# Patient Record
Sex: FEMALE | Race: BLACK OR AFRICAN AMERICAN | Employment: UNEMPLOYED | URBAN - METROPOLITAN AREA
[De-identification: names, ages, dates, MRNs, and addresses within clinical notes are randomized per-mention and may not be internally consistent; named-entity substitution may affect disease eponyms.]

---

## 2017-12-13 ENCOUNTER — ANESTHESIA (OUTPATIENT)
Dept: SURGERY | Age: 27
End: 2017-12-13
Payer: MEDICAID

## 2017-12-13 ENCOUNTER — ANESTHESIA EVENT (OUTPATIENT)
Dept: SURGERY | Age: 27
End: 2017-12-13
Payer: MEDICAID

## 2017-12-13 ENCOUNTER — HOSPITAL ENCOUNTER (OUTPATIENT)
Age: 27
Setting detail: OUTPATIENT SURGERY
Discharge: HOME | End: 2017-12-13
Attending: OBSTETRICS & GYNECOLOGY | Admitting: OBSTETRICS & GYNECOLOGY
Payer: MEDICAID

## 2017-12-13 VITALS
RESPIRATION RATE: 20 BRPM | OXYGEN SATURATION: 99 % | SYSTOLIC BLOOD PRESSURE: 110 MMHG | TEMPERATURE: 97.5 F | HEART RATE: 80 BPM | HEIGHT: 61 IN | DIASTOLIC BLOOD PRESSURE: 64 MMHG | BODY MASS INDEX: 27.09 KG/M2 | WEIGHT: 143.5 LBS

## 2017-12-13 PROBLEM — N92.6 IRREGULAR MENSTRUATION: Chronic | Status: ACTIVE | Noted: 2017-12-13

## 2017-12-13 PROBLEM — N83.201 CYST OF RIGHT OVARY: Chronic | Status: ACTIVE | Noted: 2017-12-13

## 2017-12-13 PROBLEM — R10.31 RIGHT LOWER QUADRANT ABDOMINAL PAIN: Chronic | Status: ACTIVE | Noted: 2017-12-13

## 2017-12-13 PROBLEM — N83.201 CYST OF RIGHT OVARY: Chronic | Status: RESOLVED | Noted: 2017-12-13 | Resolved: 2017-12-13

## 2017-12-13 LAB — HCG SERPL QL: NEGATIVE

## 2017-12-13 PROCEDURE — 77030018836 HC SOL IRR NACL ICUM -A: Performed by: OBSTETRICS & GYNECOLOGY

## 2017-12-13 PROCEDURE — 77030020782 HC GWN BAIR PAWS FLX 3M -B: Performed by: OBSTETRICS & GYNECOLOGY

## 2017-12-13 PROCEDURE — 84703 CHORIONIC GONADOTROPIN ASSAY: CPT | Performed by: OBSTETRICS & GYNECOLOGY

## 2017-12-13 PROCEDURE — 76210000017 HC OR PH I REC 1.5 TO 2 HR: Performed by: OBSTETRICS & GYNECOLOGY

## 2017-12-13 PROCEDURE — 77030006643: Performed by: ANESTHESIOLOGY

## 2017-12-13 PROCEDURE — 77030020255 HC SOL INJ LR 1000ML BG: Performed by: OBSTETRICS & GYNECOLOGY

## 2017-12-13 PROCEDURE — 76210000021 HC REC RM PH II 0.5 TO 1 HR: Performed by: OBSTETRICS & GYNECOLOGY

## 2017-12-13 PROCEDURE — 74011250636 HC RX REV CODE- 250/636: Performed by: OBSTETRICS & GYNECOLOGY

## 2017-12-13 PROCEDURE — 76060000033 HC ANESTHESIA 1 TO 1.5 HR: Performed by: OBSTETRICS & GYNECOLOGY

## 2017-12-13 PROCEDURE — 74011250636 HC RX REV CODE- 250/636

## 2017-12-13 PROCEDURE — 77030008517 HC TBNG INSUF ENDO STOR -B: Performed by: OBSTETRICS & GYNECOLOGY

## 2017-12-13 PROCEDURE — 36415 COLL VENOUS BLD VENIPUNCTURE: CPT | Performed by: OBSTETRICS & GYNECOLOGY

## 2017-12-13 PROCEDURE — 74011000250 HC RX REV CODE- 250: Performed by: OBSTETRICS & GYNECOLOGY

## 2017-12-13 PROCEDURE — 77030008683 HC TU ET CUF COVD -A: Performed by: ANESTHESIOLOGY

## 2017-12-13 PROCEDURE — 88305 TISSUE EXAM BY PATHOLOGIST: CPT | Performed by: OBSTETRICS & GYNECOLOGY

## 2017-12-13 PROCEDURE — 77030005537 HC CATH URETH BARD -A: Performed by: OBSTETRICS & GYNECOLOGY

## 2017-12-13 PROCEDURE — 77030008602 HC TRCR ENDOSC EPATH J&J -B: Performed by: OBSTETRICS & GYNECOLOGY

## 2017-12-13 PROCEDURE — 77030019927 HC TBNG IRR CYSTO BAXT -A: Performed by: OBSTETRICS & GYNECOLOGY

## 2017-12-13 PROCEDURE — 77030008603 HC TRCR ENDOSC EPATH J&J -C: Performed by: OBSTETRICS & GYNECOLOGY

## 2017-12-13 PROCEDURE — 74011250636 HC RX REV CODE- 250/636: Performed by: ANESTHESIOLOGY

## 2017-12-13 PROCEDURE — 76010000149 HC OR TIME 1 TO 1.5 HR: Performed by: OBSTETRICS & GYNECOLOGY

## 2017-12-13 PROCEDURE — 77030008477 HC STYL SATN SLP COVD -A: Performed by: ANESTHESIOLOGY

## 2017-12-13 PROCEDURE — 77030002933 HC SUT MCRYL J&J -A: Performed by: OBSTETRICS & GYNECOLOGY

## 2017-12-13 PROCEDURE — 74011000250 HC RX REV CODE- 250

## 2017-12-13 RX ORDER — BUPIVACAINE HYDROCHLORIDE 2.5 MG/ML
INJECTION, SOLUTION EPIDURAL; INFILTRATION; INTRACAUDAL AS NEEDED
Status: DISCONTINUED | OUTPATIENT
Start: 2017-12-13 | End: 2017-12-13 | Stop reason: HOSPADM

## 2017-12-13 RX ORDER — FENTANYL CITRATE 50 UG/ML
50 INJECTION, SOLUTION INTRAMUSCULAR; INTRAVENOUS
Status: DISCONTINUED | OUTPATIENT
Start: 2017-12-13 | End: 2017-12-13 | Stop reason: HOSPADM

## 2017-12-13 RX ORDER — SODIUM CHLORIDE, SODIUM LACTATE, POTASSIUM CHLORIDE, CALCIUM CHLORIDE 600; 310; 30; 20 MG/100ML; MG/100ML; MG/100ML; MG/100ML
100 INJECTION, SOLUTION INTRAVENOUS CONTINUOUS
Status: DISCONTINUED | OUTPATIENT
Start: 2017-12-13 | End: 2017-12-13 | Stop reason: HOSPADM

## 2017-12-13 RX ORDER — LIDOCAINE HYDROCHLORIDE 20 MG/ML
INJECTION, SOLUTION EPIDURAL; INFILTRATION; INTRACAUDAL; PERINEURAL AS NEEDED
Status: DISCONTINUED | OUTPATIENT
Start: 2017-12-13 | End: 2017-12-13 | Stop reason: HOSPADM

## 2017-12-13 RX ORDER — FLUMAZENIL 0.1 MG/ML
0.2 INJECTION INTRAVENOUS
Status: DISCONTINUED | OUTPATIENT
Start: 2017-12-13 | End: 2017-12-13 | Stop reason: HOSPADM

## 2017-12-13 RX ORDER — PROPOFOL 10 MG/ML
INJECTION, EMULSION INTRAVENOUS AS NEEDED
Status: DISCONTINUED | OUTPATIENT
Start: 2017-12-13 | End: 2017-12-13 | Stop reason: HOSPADM

## 2017-12-13 RX ORDER — FENTANYL CITRATE 50 UG/ML
INJECTION, SOLUTION INTRAMUSCULAR; INTRAVENOUS AS NEEDED
Status: DISCONTINUED | OUTPATIENT
Start: 2017-12-13 | End: 2017-12-13 | Stop reason: HOSPADM

## 2017-12-13 RX ORDER — SODIUM CHLORIDE, SODIUM LACTATE, POTASSIUM CHLORIDE, CALCIUM CHLORIDE 600; 310; 30; 20 MG/100ML; MG/100ML; MG/100ML; MG/100ML
125 INJECTION, SOLUTION INTRAVENOUS CONTINUOUS
Status: DISCONTINUED | OUTPATIENT
Start: 2017-12-13 | End: 2017-12-13 | Stop reason: HOSPADM

## 2017-12-13 RX ORDER — DEXAMETHASONE SODIUM PHOSPHATE 4 MG/ML
INJECTION, SOLUTION INTRA-ARTICULAR; INTRALESIONAL; INTRAMUSCULAR; INTRAVENOUS; SOFT TISSUE AS NEEDED
Status: DISCONTINUED | OUTPATIENT
Start: 2017-12-13 | End: 2017-12-13 | Stop reason: HOSPADM

## 2017-12-13 RX ORDER — NALOXONE HYDROCHLORIDE 0.4 MG/ML
0.4 INJECTION, SOLUTION INTRAMUSCULAR; INTRAVENOUS; SUBCUTANEOUS AS NEEDED
Status: DISCONTINUED | OUTPATIENT
Start: 2017-12-13 | End: 2017-12-13 | Stop reason: HOSPADM

## 2017-12-13 RX ORDER — NEOSTIGMINE METHYLSULFATE 5 MG/5 ML
SYRINGE (ML) INTRAVENOUS AS NEEDED
Status: DISCONTINUED | OUTPATIENT
Start: 2017-12-13 | End: 2017-12-13 | Stop reason: HOSPADM

## 2017-12-13 RX ORDER — OXYCODONE AND ACETAMINOPHEN 5; 325 MG/1; MG/1
1 TABLET ORAL AS NEEDED
Status: DISCONTINUED | OUTPATIENT
Start: 2017-12-13 | End: 2017-12-13 | Stop reason: HOSPADM

## 2017-12-13 RX ORDER — ONDANSETRON 2 MG/ML
4 INJECTION INTRAMUSCULAR; INTRAVENOUS ONCE
Status: DISCONTINUED | OUTPATIENT
Start: 2017-12-13 | End: 2017-12-13 | Stop reason: HOSPADM

## 2017-12-13 RX ORDER — SODIUM CHLORIDE 0.9 % (FLUSH) 0.9 %
5-10 SYRINGE (ML) INJECTION AS NEEDED
Status: DISCONTINUED | OUTPATIENT
Start: 2017-12-13 | End: 2017-12-13 | Stop reason: HOSPADM

## 2017-12-13 RX ORDER — MIDAZOLAM HYDROCHLORIDE 1 MG/ML
INJECTION, SOLUTION INTRAMUSCULAR; INTRAVENOUS AS NEEDED
Status: DISCONTINUED | OUTPATIENT
Start: 2017-12-13 | End: 2017-12-13 | Stop reason: HOSPADM

## 2017-12-13 RX ORDER — ONDANSETRON 2 MG/ML
INJECTION INTRAMUSCULAR; INTRAVENOUS AS NEEDED
Status: DISCONTINUED | OUTPATIENT
Start: 2017-12-13 | End: 2017-12-13 | Stop reason: HOSPADM

## 2017-12-13 RX ORDER — GLYCOPYRROLATE 0.2 MG/ML
INJECTION INTRAMUSCULAR; INTRAVENOUS AS NEEDED
Status: DISCONTINUED | OUTPATIENT
Start: 2017-12-13 | End: 2017-12-13 | Stop reason: HOSPADM

## 2017-12-13 RX ORDER — ROCURONIUM BROMIDE 10 MG/ML
INJECTION, SOLUTION INTRAVENOUS AS NEEDED
Status: DISCONTINUED | OUTPATIENT
Start: 2017-12-13 | End: 2017-12-13 | Stop reason: HOSPADM

## 2017-12-13 RX ADMIN — SODIUM CHLORIDE, SODIUM LACTATE, POTASSIUM CHLORIDE, AND CALCIUM CHLORIDE: 600; 310; 30; 20 INJECTION, SOLUTION INTRAVENOUS at 15:39

## 2017-12-13 RX ADMIN — FENTANYL CITRATE 100 MCG: 50 INJECTION, SOLUTION INTRAMUSCULAR; INTRAVENOUS at 15:23

## 2017-12-13 RX ADMIN — DEXAMETHASONE SODIUM PHOSPHATE 4 MG: 4 INJECTION, SOLUTION INTRA-ARTICULAR; INTRALESIONAL; INTRAMUSCULAR; INTRAVENOUS; SOFT TISSUE at 15:21

## 2017-12-13 RX ADMIN — FENTANYL CITRATE 50 MCG: 50 INJECTION, SOLUTION INTRAMUSCULAR; INTRAVENOUS at 15:56

## 2017-12-13 RX ADMIN — Medication 3 MG: at 16:17

## 2017-12-13 RX ADMIN — FENTANYL CITRATE 50 MCG: 50 INJECTION, SOLUTION INTRAMUSCULAR; INTRAVENOUS at 17:03

## 2017-12-13 RX ADMIN — LIDOCAINE HYDROCHLORIDE 100 MG: 20 INJECTION, SOLUTION EPIDURAL; INFILTRATION; INTRACAUDAL; PERINEURAL at 15:26

## 2017-12-13 RX ADMIN — ROCURONIUM BROMIDE 35 MG: 10 INJECTION, SOLUTION INTRAVENOUS at 15:26

## 2017-12-13 RX ADMIN — SODIUM CHLORIDE, SODIUM LACTATE, POTASSIUM CHLORIDE, AND CALCIUM CHLORIDE 125 ML/HR: 600; 310; 30; 20 INJECTION, SOLUTION INTRAVENOUS at 10:07

## 2017-12-13 RX ADMIN — MIDAZOLAM HYDROCHLORIDE 2 MG: 1 INJECTION, SOLUTION INTRAMUSCULAR; INTRAVENOUS at 15:19

## 2017-12-13 RX ADMIN — FENTANYL CITRATE 50 MCG: 50 INJECTION, SOLUTION INTRAMUSCULAR; INTRAVENOUS at 17:22

## 2017-12-13 RX ADMIN — PROPOFOL 180 MG: 10 INJECTION, EMULSION INTRAVENOUS at 15:26

## 2017-12-13 RX ADMIN — GLYCOPYRROLATE 0.2 MG: 0.2 INJECTION INTRAMUSCULAR; INTRAVENOUS at 16:17

## 2017-12-13 RX ADMIN — FENTANYL CITRATE 50 MCG: 50 INJECTION, SOLUTION INTRAMUSCULAR; INTRAVENOUS at 16:11

## 2017-12-13 RX ADMIN — ONDANSETRON 4 MG: 2 INJECTION INTRAMUSCULAR; INTRAVENOUS at 16:14

## 2017-12-13 NOTE — IP AVS SNAPSHOT
[facility] 
 
 
 [address] [facility] [address] 
[phone] Patient: Rodrigo Kat MRN: [id number] TKT:[id number] About your hospitalization You were admitted on:  December 13, 2017 You last received care in the:  [facility] PACU You were discharged on:  December 13, 2017 Why you were hospitalized Your primary diagnosis was:  Cyst Of Right Ovary Your diagnoses also included:  Right Lower Quadrant Abdominal Pain, Irregular Menstruation Things You Need To Do (next 8 weeks) Follow up with None Where:  None (395) Patient stated that they have no PCP Schedule an appointment with Gabrielle Anderson MD as soon as possible for a visit in 1 week(s) Phone:  [phone] Where:  1 [facility], [address] [place] [address] Discharge Orders None A check sia indicates which time of day the medication should be taken. My Medications ASK your physician about these medications Instructions Each Dose to Equal  
 Morning Noon Evening Bedtime  
 pnv w/o calcium-iron fum-fa 27-1 mg Tab Your last dose was: Your next dose is: Take  by mouth daily. Discharge Instructions [facility], [address] [facility], [address] [facility], [address] [place] [address],[facility] Office: [phone] Fax:    [phone] PROCEDURE: Procedure(s): DILATION AND CURETTAGE, HYSTEROSCOPY, LAPAROSCOPIC RIGHT OVARIAN CYSTECTOMY Notify [facility] OB/GYN IMMEDIATELY if any of the following occur: ? You are unable to urinate. Urgency to urinate is not uncommon. ? Excessive vaginal bleeding > 1 maxi pad an hour for more then 2 hours straight. ? Temperature above 101.0° and / or chills. ? You are nauseous and / or vomiting and you cannot hold down any fluids. ? Your pain is not controlled with the pain medication prescribed. Special Considerations:  
 
? Do not drive for at least 24 hours after the procedure and until you are no longer taking narcotic pain medication and you are able to move and react without hesitation. ? You may return to work in 1 weeks. Pelvic rest (nothing in the vagina) for 3 weeks. No heavy lifting over 10 pounds & no strenuous exercise for 3 weeks. Other instructions: MEDICATIONS: PROVIDED AT DISCHARGE OR PREVIOUSLY PRESCRIBED AT PRE OPERATIVE APPOINTMENT WITH [facility] OB[facility] -- 
Narcotic Pain Med. Vicodin®     Percocet®     Dilaudid® Non-narcotic Pain Med. Ibuprofen Antibiotics     Cipro®     Keflex®       Bactrim DS® Urgency      Vesicare® Nausea [place] Phenergan® Other       Colace If you have not already scheduled your post operative appointment please do so with our office staff. Your appointment should be in 3 weeks. Please contact [facility]. OB/GYN at [phone] or go to the nearest Emergency Department / Urgent Care facility for any other medical questions or concerns. DISCHARGE SUMMARY from Nurse PATIENT INSTRUCTIONS: 
 
After general anesthesia or intravenous sedation, for 24 hours or while taking prescription Narcotics: · Limit your activities · Do not drive and operate hazardous machinery · Do not make important personal or business decisions · Do  not drink alcoholic beverages · If you have not urinated within 8 hours after discharge, please contact your surgeon on call. Report the following to your surgeon: 
· Excessive pain, swelling, redness or odor of or around the surgical area · Temperature over 100.5 · Nausea and vomiting lasting longer than 4 hours or if unable to take medications · Any signs of decreased circulation or nerve impairment to extremity: change in color, persistent  numbness, tingling, coldness or increase pain · Any questions What to do at Home: 
Recommended activity: Activity as tolerated and no driving for today, If you experience any of the following symptoms fever, chills, uncontrollable pain or nausea, please follow up with . 
 
*  Please give a list of your current medications to your Primary Care Provider. *  Please update this list whenever your medications are discontinued, doses are 
    changed, or new medications (including over-the-counter products) are added. *  Please carry medication information at all times in case of emergency situations. These are general instructions for a healthy lifestyle: No smoking/ No tobacco products/ Avoid exposure to second hand smoke Surgeon General's Warning:  Quitting smoking now greatly reduces serious risk to your health. Obesity, smoking, and sedentary lifestyle greatly increases your risk for illness A healthy diet, regular physical exercise & weight monitoring are important for maintaining a healthy lifestyle You may be retaining fluid if you have a history of heart failure or if you experience any of the following symptoms:  Weight gain of 3 pounds or more overnight or 5 pounds in a week, increased swelling in our hands or feet or shortness of breath while lying flat in bed. Please call your doctor as soon as you notice any of these symptoms; do not wait until your next office visit. Recognize signs and symptoms of STROKE: 
 
F-face looks uneven A-arms unable to move or move unevenly S-speech slurred or non-existent T-time-call 911 as soon as signs and symptoms begin-DO NOT go Back to bed or wait to see if you get better-TIME IS BRAIN. Warning Signs of HEART ATTACK Call 911 if you have these symptoms: 
? Chest discomfort.  Most heart attacks involve discomfort in the center of the chest that lasts more than a few minutes, or that goes away and comes back. It can feel like uncomfortable pressure, squeezing, fullness, or pain. ? Discomfort in other areas of the upper body. Symptoms can include pain or discomfort in one or both arms, the back, neck, jaw, or stomach. ? Shortness of breath with or without chest discomfort. ? Other signs may include breaking out in a cold sweat, nausea, or lightheadedness. Don't wait more than five minutes to call [address]! Fast action can save your life. Calling 911 is almost always the fastest way to get lifesaving treatment. Emergency Medical Services staff can begin treatment when they arrive  up to an hour sooner than if someone gets to the hospital by car. The discharge information has been reviewed with the patient and caregiver. The patient and caregiver verbalized understanding. Discharge medications reviewed with the patient and caregiver and appropriate educational materials and side effects teaching were provided. ___________________________________________________________________________________________________________________________________ Patient armband removed and shredded [employer] Announcement We are excited to announce that we are making your provider's discharge notes available to you in [employer]. You will see these notes when they are completed and signed by the physician that discharged you from your recent hospital stay. If you have any questions or concerns about any information you see in FClubhart, please call the Health Information Department where you were seen or reach out to your Primary Care Provider for more information about your plan of care. Introducing [facility]! Trisha Mcduffie introduces [employer] patient portal. Now you can access parts of your medical record, email your doctor's office, and request medication refills online.    
 
1. In your internet browser, go to [email or web address] 2. Click on the First Time User? Click Here link in the Sign In box. You will see the New Member Sign Up page. 3. Enter your [employer] Systems Access Code exactly as it appears below. You will not need to use this code after youve completed the sign-up process. If you do not sign up before the expiration date, you must request a new code. · [employer] Access Code: [email or web address] Expires: 3/13/2018  9:07 AM 
 
4. Enter the last four digits of your Social Security Number (xxxx) and Date of Birth (mm/dd/yyyy) as indicated and click Submit. You will be taken to the next sign-up page. 5. Create a JoyTunest ID. This will be your [employer] login ID and cannot be changed, so think of one that is secure and easy to remember. 6. Create a [employer] password. You can change your password at any time. 7. Enter your Password Reset Question and Answer. This can be used at a later time if you forget your password. 8. Enter your e-mail address. You will receive e-mail notification when new information is available in [address] 9. Click Sign Up. You can now view and download portions of your medical record. 10. Click the Download Summary menu link to download a portable copy of your medical information. If you have questions, please visit the Frequently Asked Questions section of the [employer] website. Remember, [employer] is NOT to be used for urgent needs. For medical emergencies, dial 911. Now available from your iPhone and Android! Providers Seen During Your Hospitalization Provider Specialty Primary office phone Gabrielle Anderson MD Obstetrics & Gynecology [phone] Your Primary Care Physician (PCP) Primary Care Physician Office Phone Office Fax NONE ** None ** ** None ** You are allergic to the following Allergen Reactions Latex Rash Penicillins Rash Tramadol Nausea Only Recent Documentation Height Weight BMI OB Status Smoking Status 1.549 m 65.1 kg 27.11 kg/m2 Having regular periods Never Smoker Emergency Contacts Name Discharge Info Relation Home Work Mobile Naldo Samano DISCHARGE CAREGIVER [3] Sister [23]   [phone] Genaro Rollins DECLINED CAREGIVER [4] Friend [5] [phone] Patient Belongings The following personal items are in your possession at time of discharge: 
  Dental Appliances: None  Visual Aid: None      Home Medications: None   Jewelry: Earrings, Sent home (to sandi)  Clothing: Pants, Shirt, Undergarments, Footwear, Socks, Jacket/Coat (locker 17)    Other Valuables: Purse, [employer], Sent home (to sandi) Please provide this summary of care documentation to your next provider. Signatures-by signing, you are acknowledging that this After Visit Summary has been reviewed with you and you have received a copy. Patient Signature:  ____________________________________________________________ Date:  ____________________________________________________________  
  
Silvana Fernandoer Provider Signature:  ____________________________________________________________ Date:  ____________________________________________________________

## 2017-12-13 NOTE — BRIEF OP NOTE
BRIEF OPERATIVE NOTE    Date of Procedure: 12/13/2017   Preoperative Diagnosis: OVARIAN CYST RIGHT SIDE, IRREGULAR MENSTRUATION  Postoperative Diagnosis: OVARIAN CYST RIGHT SIDE, IRREGULAR MENSTRUATION    Procedure(s):  DILATION AND CURETTAGE, HYSTEROSCOPY, LAPAROSCOPIC RIGHT OVARIAN CYSTECTOMY  Surgeon(s) and Role:     * Silvano Vidal MD - Primary         Assistant Staff:       Surgical Staff:  Circ-1: Sweta Castañeda RN  Circ-Relief: Yumiko Murray  Scrub Tech-1: Sahil Mejia  Scrub RN-1: Abner Hall RN  Float Staff: Maru Amin RN  Event Time In   Incision Start 1550   Incision Close 1620     Anesthesia: General   Estimated Blood Loss: min  Specimens:   ID Type Source Tests Collected by Time Destination   1 : Endometrial Curettings Preservative Endometrial Curetting  Silvano Vidal MD 12/13/2017 1603 Pathology   2 : Right Ovarian Cyst Preservative Ovarian Cyst,Right  Silvano Vidal MD 12/13/2017 1615 Pathology      Findings: see full op report   Complications: none  Implants: * No implants in log *

## 2017-12-13 NOTE — ANESTHESIA POSTPROCEDURE EVALUATION
Post-Anesthesia Evaluation and Assessment    Patient: Gracie Bliss MRN: [id number]  SSN: [id number]   YOB: 1990  Age: 32 y.o. Sex: female      Cardiovascular Function/Vital Signs  /72  Pulse 83  Temp 36.3 °C (97.3 °F)  Resp 21  Ht 5' 1\" (1.549 m)  Wt 65.1 kg (143 lb 8 oz)  LMP 12/10/2017  SpO2 100%  BMI 27.11 kg/m2    Patient is status post Procedure(s):  DILATION AND CURETTAGE, HYSTEROSCOPY, LAPAROSCOPIC RIGHT OVARIAN CYSTECTOMY. Nausea/Vomiting: Controlled. Postoperative hydration reviewed and adequate. Pain:  Pain Scale 1: Numeric (0 - 10) (12/13/17 1745)  Pain Intensity 1: 3 (12/13/17 1745)   Managed. Neurological Status:   Neuro (WDL): Within Defined Limits (12/13/17 1745)   At baseline. Mental Status and Level of Consciousness: Awake/alert    Pulmonary Status:   O2 Device: Non-rebreather mask (12/13/17 1631)   Adequate oxygenation and airway patent. Complications related to anesthesia: None    Post-anesthesia assessment completed. No concerns. Patient has met all discharge requirements.     Signed By: Nidhi Morris CRNA    December 13, 2017

## 2017-12-13 NOTE — ANESTHESIA PREPROCEDURE EVALUATION
Anesthetic History   No history of anesthetic complications            Review of Systems / Medical History  Patient summary reviewed, nursing notes reviewed and pertinent labs reviewed    Pulmonary            Asthma : well controlled  Pertinent negatives: No COPD, recent URI, sleep apnea and smoker     Neuro/Psych   Within defined limits           Cardiovascular  Within defined limits                Exercise tolerance: >4 METS     GI/Hepatic/Renal         Renal disease: stones    Pertinent negatives: No GERD, hepatitis and liver disease   Endo/Other        Anemia  Pertinent negatives: No diabetes, hypothyroidism, hyperthyroidism, obesity and blood dyscrasia   Other Findings              Physical Exam    Airway  Mallampati: II  TM Distance: 4 - 6 cm  Neck ROM: normal range of motion   Mouth opening: Normal     Cardiovascular  Regular rate and rhythm,  S1 and S2 normal,  no murmur, click, rub, or gallop             Dental  No notable dental hx       Pulmonary  Breath sounds clear to auscultation               Abdominal  GI exam deferred       Other Findings            Anesthetic Plan    ASA: 2  Anesthesia type: general          Induction: Intravenous  Anesthetic plan and risks discussed with: Patient and Family      GA/OETT

## 2017-12-13 NOTE — DISCHARGE INSTRUCTIONS
[facility], [address], [place]  [facility], [address] [facility], [address] [facility], [place], [address] [facility]  Office: [phone]  Fax:    [phone]    PROCEDURE: Procedure(s):  DILATION AND CURETTAGE, HYSTEROSCOPY, LAPAROSCOPIC RIGHT OVARIAN CYSTECTOMY    Notify [facility] OB/GYN IMMEDIATELY if any of the following occur:     You are unable to urinate. Urgency to urinate is not uncommon.  Excessive vaginal bleeding > 1 maxi pad an hour for more then 2 hours straight.  Temperature above 101.0° and / or chills.  You are nauseous and / or vomiting and you cannot hold down any fluids.  Your pain is not controlled with the pain medication prescribed. Special Considerations:      Do not drive for at least 24 hours after the procedure and until you are no longer taking narcotic pain medication and you are able to move and react without hesitation.  You may return to work in 1 weeks. [x] Pelvic rest (nothing in the vagina) for 3 weeks. [x] No heavy lifting over 10 pounds & no strenuous exercise for 3 weeks. [] Other instructions:         MEDICATIONS: PROVIDED AT DISCHARGE OR PREVIOUSLY PRESCRIBED AT PRE OPERATIVE APPOINTMENT WITH [facility] OBSTETRICS --  Narcotic Pain Med.   []  Vicodin®   [x]  Percocet®   []  Dilaudid®    Non-narcotic Pain Med. [x]   Ibuprofen        Antibiotics   []  Cipro®   []  Keflex®     []  Bactrim DS®       Urgency   []   Vesicare®       Nausea      []    Zofran®     []    Phenergan®       Other   []    Colace          If you have not already scheduled your post operative appointment please do so with our office staff. Your appointment should be in 3 weeks. Please contact [place] [address] OB/GYN at [phone] or go to the nearest Emergency Department / Urgent Care facility for any other medical questions or concerns.              DISCHARGE SUMMARY from Nurse    PATIENT INSTRUCTIONS:    After general anesthesia or intravenous sedation, for 24 hours or while taking prescription Narcotics:  · Limit your activities  · Do not drive and operate hazardous machinery  · Do not make important personal or business decisions  · Do  not drink alcoholic beverages  · If you have not urinated within 8 hours after discharge, please contact your surgeon on call. Report the following to your surgeon:  · Excessive pain, swelling, redness or odor of or around the surgical area  · Temperature over 100.5  · Nausea and vomiting lasting longer than 4 hours or if unable to take medications  · Any signs of decreased circulation or nerve impairment to extremity: change in color, persistent  numbness, tingling, coldness or increase pain  · Any questions    What to do at Home:  Recommended activity: Activity as tolerated and no driving for today,     If you experience any of the following symptoms fever, chills, uncontrollable pain or nausea, please follow up with .    *  Please give a list of your current medications to your Primary Care Provider. *  Please update this list whenever your medications are discontinued, doses are      changed, or new medications (including over-the-counter products) are added. *  Please carry medication information at all times in case of emergency situations. These are general instructions for a healthy lifestyle:    No smoking/ No tobacco products/ Avoid exposure to second hand smoke  Surgeon General's Warning:  Quitting smoking now greatly reduces serious risk to your health.     Obesity, smoking, and sedentary lifestyle greatly increases your risk for illness    A healthy diet, regular physical exercise & weight monitoring are important for maintaining a healthy lifestyle    You may be retaining fluid if you have a history of heart failure or if you experience any of the following symptoms:  Weight gain of 3 pounds or more overnight or 5 pounds in a week, increased swelling in our hands or feet or shortness of breath while lying flat in bed. Please call your doctor as soon as you notice any of these symptoms; do not wait until your next office visit. Recognize signs and symptoms of STROKE:    F-face looks uneven    A-arms unable to move or move unevenly    S-speech slurred or non-existent    T-time-call 911 as soon as signs and symptoms begin-DO NOT go       Back to bed or wait to see if you get better-TIME IS BRAIN. Warning Signs of HEART ATTACK     Call 911 if you have these symptoms:   Chest discomfort. Most heart attacks involve discomfort in the center of the chest that lasts more than a few minutes, or that goes away and comes back. It can feel like uncomfortable pressure, squeezing, fullness, or pain.  Discomfort in other areas of the upper body. Symptoms can include pain or discomfort in one or both arms, the back, neck, jaw, or stomach.  Shortness of breath with or without chest discomfort.  Other signs may include breaking out in a cold sweat, nausea, or lightheadedness. Don't wait more than five minutes to call 911 - MINUTES MATTER! Fast action can save your life. Calling 911 is almost always the fastest way to get lifesaving treatment. Emergency Medical Services staff can begin treatment when they arrive -- up to an hour sooner than if someone gets to the hospital by car. The discharge information has been reviewed with the patient and caregiver. The patient and caregiver verbalized understanding. Discharge medications reviewed with the patient and caregiver and appropriate educational materials and side effects teaching were provided.   ___________________________________________________________________________________________________________________________________    Patient armband removed and shredded

## 2017-12-13 NOTE — DISCHARGE SUMMARY
Discharge Summary     Name: aMricel Simmons MRN: [id number]  SSN: [id number]    YOB: 1990  Age: 32 y.o. Sex: female      Admit Date: 12/13/2017    Discharge Date: 12/13/2017      Admitting Physician: Amparo Chapa MD     * Admission Diagnoses: Abnormal uterine bleeding, Ovarian cyst persistent, Right lower quadrant pain    * Discharge Diagnoses:   Hospital Problems as of 12/13/2017  Date Reviewed: 12/13/2017          Codes Class Noted - Resolved POA    Right lower quadrant abdominal pain (Chronic) ICD-10-CM: R10.31  ICD-9-CM: 789.03  12/13/2017 - Present Yes        * (Principal)Cyst of right ovary (Chronic) ICD-10-CM: N83.201  ICD-9-CM: 620.2  12/13/2017 - Present Yes        Irregular menstruation (Chronic) ICD-10-CM: N92.6  ICD-9-CM: 626.4  12/13/2017 - Present Yes               * Procedures: Laparoscopic Right Ovarian Cystectomy, Hysteroscopy D&C    * Discharge Condition: [facility] Course: Normal hospital course for this procedure. Significant Diagnostic Studies:   Recent Results (from the past 24 hour(s))   HCG QL SERUM    Collection Time: 12/13/17  9:20 AM   Result Value Ref Range    HCG, Ql. NEGATIVE  NEG         * Disposition: Home    Discharge Medications:   Current Discharge Medication List           * Follow-up Care/Patient Instructions: Activity: No sex, douching, or tampons for 3 weeks or as directed by your physician. No heavy lifting for 3 weeks. No driving while taking pain medication. Diet: Resume pre-hospital diet  Wound Care: Keep wound clean and dry and pelvic rest x 3 weeks.        Signed By:  Amparo Chapa MD     December 13, 2017

## 2017-12-14 NOTE — PERIOP NOTES
Discharge instructions reviewed with patient and caregiver. No questions. Patient stated she has prescriptions filled at home. Peripad given to patient. Patient dressed with caregiver's help. Instructions given to caregiver. Taken to private vehicle via wheelcair. Patient awake and alert. No distress noted.

## 2017-12-15 NOTE — OP NOTES
[facility] [place]  OPERATIVE REPORT    Benjamin Lomas  MR#: [id number]  : 1990  ACCOUNT #: [de-identified]   DATE OF SERVICE: 2017    PREOPERATIVE DIAGNOSES:    1. Right lower quadrant pain. 2.  Persistent right ovarian cyst.  3.  Irregular menses. POSTOPERATIVE DIAGNOSES:    1. Right lower quadrant pain. 2.  Persistent right ovarian cyst.  3.  Irregular menses. PROCEDURES PERFORMED:    1. Laparoscopic right ovarian cystectomy. 2.  Hysteroscopy, dilation and curettage. SURGEON:  Prudence Yung MD    ANESTHESIOLOGIST:  Dr. Ann Kendrick:  General and paracervical block. COMPLICATIONS:  None. ESTIMATED BLOOD LOSS:  Minimal.    INTRAVENOUS FLUIDS:  1700 mL lactated Ringer's. SPECIMENS:    1.  Endometrial curettings. 2.  Right ovarian cyst wall. FINDINGS:    1.  A anteverted uterus sounding to 7 cm with normal-appearing ostia bilaterally and overall unremarkable endometrial cavity. 2.  On laparoscopy, normal-appearing uterus and fallopian tubes and left ovary. Right ovary normal size with a small ovarian cyst with clear fluid. INDICATIONS:  This is a 32year old complaining of right lower quadrant pain with a known history of ovarian cyst and 2 previous ovarian cystectomies with a repeat transvaginal ultrasound demonstrating persistence of a right ovarian cyst with septum in place and original size of 2.7 cm, had increased since then in size. Risks, benefits and alternatives were discussed and she opted for surgical management as stated above. She also has a history of irregular menses and opted for hysteroscopy D and C for further endometrial evaluation and sampling at the same time. DESCRIPTION OF PROCEDURE:  The patient was taken to the OR where general anesthesia was obtained without difficulty. She was prepared and draped in the usual sterile fashion in the dorsal lithotomy position with legs in stirrups.   A weighted speculum was placed on the vagina so as to retract the anterior fornix to expose the cervix. The anterior lip of the cervix was grasped with a single-tooth tenaculum and a paracervical block was performed using 20 mL of 0.25% Marcaine plain. The cervical os was gradually dilated to allow introduction of the hysteroscope. This was advanced into the uterus under direct visualization with the water running. The above findings were noted. The hysteroscope was removed. A gentle curettage was performed until a gritty texture was noted throughout. Specimens handed off. Hulka tenaculum placed into the uterus as a means to manipulate it throughout the remainder of the case. All other instruments removed. Excellent hemostasis assured. Attention was then turned to above where a towel clip was placed in infraumbilical fold. A 5 mm vertical skin incision was made in the infraumbilical fold and a Veress needle was placed with 2 audible pops. The CO2 gas was turned on and allowed to insufflate the abdomen to about 15 mmHg. The Veress needle was removed and a 5 mm bladed trocar was advanced intraabdominally under direct visualization. The CO2 gas was turned back on. Immediate inspection beneath the trocar insertion site demonstrated no obvious trauma. The patient was placed in steep Trendelenburg. Two additional 5 mm bladed trocars were placed, one in the left lower quadrant 2 fingerbreadths medial and superior to the anterior and superior iliac spine and another midline 2 fingerbreadths above the pubic bone in a previous trocar scar. Attention was turned to the right adnexa where the fallopian tube was grasped with an atraumatic grasper to expose the physic portion of the right ovary. This was then cut along the ovarian cortex with mono cecilio with cautery. Linear fashion cystotomy performed with clear fluids noted and the cyst wall was removed with a biopsy grasper.   There bipolar Kleppinger was then used to obtain excellent hemostasis. Irrigation performed. Excellent hemostasis assured at the end of the case. CO2 gas was turned off. Instruments removed from the abdomen. All trocars were removed. Trocar sites were sealed with Dermabond. The Hulka tenaculum was removed from below. The patient tolerated the procedure well. Sponge, lap, needle, instrument counts were correct x2. She was taken to recovery area in stable condition.       Tg Chavez MD       TT /   D: 12/13/2017 16:31     T: 12/15/2017 10:39  JOB #: [id number]

## 2021-02-22 LAB
ANTIBODY SCREEN, EXTERNAL: NEGATIVE
CHLAMYDIA, EXTERNAL: NEGATIVE
HBSAG, EXTERNAL: NEGATIVE
HIV, EXTERNAL: NORMAL
N. GONORRHEA, EXTERNAL: NEGATIVE
PLATELET CNT,   EXTERNAL: 252
RPR, EXTERNAL: NORMAL
RUBELLA, EXTERNAL: NORMAL

## 2021-06-22 LAB
GTT 60 MIN, EXTERNAL: 61
HGB, EXTERNAL: 10.9

## 2021-08-25 LAB — GRBS, EXTERNAL: NEGATIVE

## 2021-09-05 ENCOUNTER — HOSPITAL ENCOUNTER (INPATIENT)
Age: 31
LOS: 2 days | Discharge: HOME | DRG: 560 | End: 2021-09-07
Attending: OBSTETRICS & GYNECOLOGY | Admitting: OBSTETRICS & GYNECOLOGY
Payer: MEDICAID

## 2021-09-05 PROBLEM — Z34.90 TERM PREGNANCY: Status: ACTIVE | Noted: 2021-09-05

## 2021-09-05 LAB
ABO + RH BLD: NORMAL
APPEARANCE UR: ABNORMAL
BASOPHILS # BLD: 0 K/UL (ref 0–0.1)
BASOPHILS NFR BLD: 0 % (ref 0–2)
BILIRUB UR QL: NEGATIVE
BLOOD GROUP ANTIBODIES SERPL: NORMAL
COLOR UR: YELLOW
DIFFERENTIAL METHOD BLD: ABNORMAL
EOSINOPHIL # BLD: 0 K/UL (ref 0–0.4)
EOSINOPHIL NFR BLD: 0 % (ref 0–5)
ERYTHROCYTE [DISTWIDTH] IN BLOOD BY AUTOMATED COUNT: 13.3 % (ref 11.6–14.5)
GLUCOSE UR QL STRIP.AUTO: 100 MG/DL
HCT VFR BLD AUTO: 32.1 % (ref 35–45)
HGB BLD-MCNC: 10.5 G/DL (ref 12–16)
KETONES UR-MCNC: NEGATIVE MG/DL
LEUKOCYTE ESTERASE UR QL STRIP: ABNORMAL
LYMPHOCYTES # BLD: 1.5 K/UL (ref 0.9–3.6)
LYMPHOCYTES NFR BLD: 12 % (ref 21–52)
MCH RBC QN AUTO: 29.5 PG (ref 24–34)
MCHC RBC AUTO-ENTMCNC: 32.7 G/DL (ref 31–37)
MCV RBC AUTO: 90.2 FL (ref 78–100)
MONOCYTES # BLD: 1.2 K/UL (ref 0.05–1.2)
MONOCYTES NFR BLD: 10 % (ref 3–10)
NEUTS SEG # BLD: 9.5 K/UL (ref 1.8–8)
NEUTS SEG NFR BLD: 77 % (ref 40–73)
NITRITE UR QL: NEGATIVE
PH UR: 5.5 [PH] (ref 5–9)
PLATELET # BLD AUTO: 149 K/UL (ref 135–420)
PMV BLD AUTO: 13.6 FL (ref 9.2–11.8)
PROT UR QL: NEGATIVE MG/DL
RBC # BLD AUTO: 3.56 M/UL (ref 4.2–5.3)
RBC # UR STRIP: NEGATIVE /UL
SERVICE CMNT-IMP: ABNORMAL
SP GR UR: 1.02 (ref 1–1.02)
SPECIMEN EXP DATE BLD: NORMAL
UROBILINOGEN UR QL: 1 EU/DL (ref 0.2–1)
WBC # BLD AUTO: 12.4 K/UL (ref 4.6–13.2)

## 2021-09-05 PROCEDURE — 99285 EMERGENCY DEPT VISIT HI MDM: CPT | Performed by: OBSTETRICS & GYNECOLOGY

## 2021-09-05 PROCEDURE — 65270000029 HC RM PRIVATE

## 2021-09-05 PROCEDURE — 86901 BLOOD TYPING SEROLOGIC RH(D): CPT

## 2021-09-05 PROCEDURE — 59025 FETAL NON-STRESS TEST: CPT | Performed by: OBSTETRICS & GYNECOLOGY

## 2021-09-05 PROCEDURE — 96374 THER/PROPH/DIAG INJ IV PUSH: CPT | Performed by: OBSTETRICS & GYNECOLOGY

## 2021-09-05 PROCEDURE — 85025 COMPLETE CBC W/AUTO DIFF WBC: CPT

## 2021-09-05 PROCEDURE — 75410000002 HC LABOR FEE PER 1 HR: Performed by: OBSTETRICS & GYNECOLOGY

## 2021-09-05 PROCEDURE — 96365 THER/PROPH/DIAG IV INF INIT: CPT | Performed by: OBSTETRICS & GYNECOLOGY

## 2021-09-05 PROCEDURE — 81003 URINALYSIS AUTO W/O SCOPE: CPT

## 2021-09-05 PROCEDURE — 74011250636 HC RX REV CODE- 250/636: Performed by: MIDWIFE

## 2021-09-05 RX ORDER — BUTORPHANOL TARTRATE 2 MG/ML
1 INJECTION INTRAMUSCULAR; INTRAVENOUS
Status: DISCONTINUED | OUTPATIENT
Start: 2021-09-05 | End: 2021-09-07 | Stop reason: HOSPADM

## 2021-09-05 RX ORDER — OXYTOCIN/RINGER'S LACTATE 30/500 ML
10 PLASTIC BAG, INJECTION (ML) INTRAVENOUS AS NEEDED
Status: DISCONTINUED | OUTPATIENT
Start: 2021-09-05 | End: 2021-09-06 | Stop reason: HOSPADM

## 2021-09-05 RX ORDER — MINERAL OIL
30 OIL (ML) ORAL AS NEEDED
Status: DISCONTINUED | OUTPATIENT
Start: 2021-09-05 | End: 2021-09-06 | Stop reason: HOSPADM

## 2021-09-05 RX ORDER — BUTORPHANOL TARTRATE 2 MG/ML
1 INJECTION INTRAMUSCULAR; INTRAVENOUS
Status: COMPLETED | OUTPATIENT
Start: 2021-09-05 | End: 2021-09-05

## 2021-09-05 RX ORDER — HYDROMORPHONE HYDROCHLORIDE 1 MG/ML
1 INJECTION, SOLUTION INTRAMUSCULAR; INTRAVENOUS; SUBCUTANEOUS
Status: DISCONTINUED | OUTPATIENT
Start: 2021-09-05 | End: 2021-09-06 | Stop reason: HOSPADM

## 2021-09-05 RX ORDER — BUTORPHANOL TARTRATE 2 MG/ML
2 INJECTION INTRAMUSCULAR; INTRAVENOUS
Status: DISCONTINUED | OUTPATIENT
Start: 2021-09-05 | End: 2021-09-06 | Stop reason: HOSPADM

## 2021-09-05 RX ORDER — FENTANYL CITRATE 50 UG/ML
100 INJECTION, SOLUTION INTRAMUSCULAR; INTRAVENOUS ONCE
Status: DISCONTINUED | OUTPATIENT
Start: 2021-09-06 | End: 2021-09-06

## 2021-09-05 RX ORDER — METHYLERGONOVINE MALEATE 0.2 MG/ML
0.2 INJECTION INTRAVENOUS AS NEEDED
Status: COMPLETED | OUTPATIENT
Start: 2021-09-05 | End: 2021-09-06

## 2021-09-05 RX ORDER — FENTANYL/ROPIVACAINE/NS/PF 2MCG/ML-.1
1-15 PLASTIC BAG, INJECTION (ML) EPIDURAL
Status: DISCONTINUED | OUTPATIENT
Start: 2021-09-06 | End: 2021-09-06

## 2021-09-05 RX ORDER — FENTANYL CITRATE 50 UG/ML
INJECTION, SOLUTION INTRAMUSCULAR; INTRAVENOUS
Status: DISCONTINUED
Start: 2021-09-05 | End: 2021-09-06 | Stop reason: WASHOUT

## 2021-09-05 RX ORDER — SODIUM CHLORIDE, SODIUM LACTATE, POTASSIUM CHLORIDE, CALCIUM CHLORIDE 600; 310; 30; 20 MG/100ML; MG/100ML; MG/100ML; MG/100ML
125 INJECTION, SOLUTION INTRAVENOUS CONTINUOUS
Status: DISCONTINUED | OUTPATIENT
Start: 2021-09-05 | End: 2021-09-06

## 2021-09-05 RX ORDER — NALOXONE HYDROCHLORIDE 0.4 MG/ML
0.2 INJECTION, SOLUTION INTRAMUSCULAR; INTRAVENOUS; SUBCUTANEOUS AS NEEDED
Status: DISCONTINUED | OUTPATIENT
Start: 2021-09-05 | End: 2021-09-06 | Stop reason: HOSPADM

## 2021-09-05 RX ORDER — PHENYLEPHRINE HCL IN 0.9% NACL 1 MG/10 ML
80 SYRINGE (ML) INTRAVENOUS AS NEEDED
Status: DISCONTINUED | OUTPATIENT
Start: 2021-09-05 | End: 2021-09-06 | Stop reason: HOSPADM

## 2021-09-05 RX ORDER — SODIUM CHLORIDE, SODIUM LACTATE, POTASSIUM CHLORIDE, CALCIUM CHLORIDE 600; 310; 30; 20 MG/100ML; MG/100ML; MG/100ML; MG/100ML
150 INJECTION, SOLUTION INTRAVENOUS CONTINUOUS
Status: DISCONTINUED | OUTPATIENT
Start: 2021-09-05 | End: 2021-09-06

## 2021-09-05 RX ORDER — FENTANYL/ROPIVACAINE/NS/PF 2MCG/ML-.1
PLASTIC BAG, INJECTION (ML) EPIDURAL
Status: DISCONTINUED
Start: 2021-09-05 | End: 2021-09-06 | Stop reason: WASHOUT

## 2021-09-05 RX ORDER — SODIUM CHLORIDE 0.9 % (FLUSH) 0.9 %
5-40 SYRINGE (ML) INJECTION EVERY 8 HOURS
Status: DISCONTINUED | OUTPATIENT
Start: 2021-09-06 | End: 2021-09-06

## 2021-09-05 RX ORDER — OXYTOCIN/0.9 % SODIUM CHLORIDE 30/500 ML
87.3 PLASTIC BAG, INJECTION (ML) INTRAVENOUS AS NEEDED
Status: COMPLETED | OUTPATIENT
Start: 2021-09-05 | End: 2021-09-06

## 2021-09-05 RX ORDER — SODIUM CHLORIDE 0.9 % (FLUSH) 0.9 %
5-40 SYRINGE (ML) INJECTION AS NEEDED
Status: DISCONTINUED | OUTPATIENT
Start: 2021-09-05 | End: 2021-09-06 | Stop reason: HOSPADM

## 2021-09-05 RX ORDER — LIDOCAINE HYDROCHLORIDE 10 MG/ML
20 INJECTION, SOLUTION EPIDURAL; INFILTRATION; INTRACAUDAL; PERINEURAL AS NEEDED
Status: DISCONTINUED | OUTPATIENT
Start: 2021-09-05 | End: 2021-09-06 | Stop reason: HOSPADM

## 2021-09-05 RX ORDER — TERBUTALINE SULFATE 1 MG/ML
0.25 INJECTION SUBCUTANEOUS
Status: DISCONTINUED | OUTPATIENT
Start: 2021-09-05 | End: 2021-09-06 | Stop reason: HOSPADM

## 2021-09-05 RX ADMIN — SODIUM CHLORIDE, SODIUM LACTATE, POTASSIUM CHLORIDE, AND CALCIUM CHLORIDE 150 ML/HR: 600; 310; 30; 20 INJECTION, SOLUTION INTRAVENOUS at 21:06

## 2021-09-05 RX ADMIN — BUTORPHANOL TARTRATE 1 MG: 2 INJECTION, SOLUTION INTRAMUSCULAR; INTRAVENOUS at 21:05

## 2021-09-05 NOTE — PROGRESS NOTES
194- Pt arrived to L&D with c/o contractions that started last night and intensified at 1700, G2,P1, previous  in 2016. Pt requests TOLAC. Pt 38.0 weeks. Pt of [employer]. Pt denies VB or LOF. Pt reports feeling fetal movement. Pt assisted pt to restroom for urine sample and to change into gown. - Abdomen soft to palpation and non-tender. EFM monitors applied. [facility] 3/90/0.     - Pt turned right lateral.     - YANG Mcgrath present on unit and notified of pts arrival, SVE, c/o, and EFM tracing. - Pt turned left lateral.     - Pt requests pain medication. EFM tracing reviewed with YANG Mcgrath. Orders received for IV Stadol and 1 L LR bolus. - Pt resting comfortably in bed. Chest rise, fall and unlabored respirations observed. Bed in lowest position. Call bell within reach. - Pt ambulated to restroom to void with assist x1.     - SVE /-2.     - YANG Mgcrath notified of SVE. Orders received for admission. 233- YANG Mcgrath at bedside to discuss plan of care and pts desire for TOLAC. Pt states she desires TOLAC. 2335- SVE by YANG Mcgrath. SVE 7/100/-2.    2343- Pt requests epidural. Dr. Andres Frederick MD paged. 0000- Pt reports urge to push, declines SVE. Mackenzie Renteria CNM at bedside. 0005- LR bolus started. RN and YANG Mcgrath remain at bedside providing continuous labor support, and EFM tracing, contraction pattern. 0012- Pt turned right lateral. RN and CNM remain at bedside. 0019- AROM at this time by YANG Mcgrath. FSE applied by LILIANA. SVE 8100/-2. O2 applied. Jaclyn Frederick MD at bedside for epidural placement. 0023- Sitting up on side of bed. Position reviewed. 0025- RN and YANG Mcgrath remain at bedside continuously assessing contraction pattern and fetal heart tracing. 003- RN and YANG Mcgrath remain at bedside continuously assessing contraction pattern and fetal heart tracing.     0109- Epidural placed by  Jarret Wall MD. Pt reports urge to push. Fetal head observed to be . 5-  of viable male baby. Baby dried, stimulated, and bulb suctioned by RN. Baby placed skin to skin with MOB.     0039-  of placenta by YANG Gibbs. Placenta inspected by CNM and released to pt.     0043- Methergine administered per order in left leg. 56- Jessica care complete. VSS. Pt instructed to call nursing before getting out of bed, verbalizes understanding. 0247- Pt ambulated to restroom to void with assist x1. Pt denies any numbness, weakness, or visual disturbances. 0250- Pt voided 800ml. Pt educated on jessica care and s/s to report. Pads changed. Pt returned to bed without difficulty. Pt instructed to call nursing for assistance before getting out of bed, verbalizes understanding. 0340- TRANSFER - OUT REPORT:  Bedside and verbal report given to NIHARIKA Henry on [facility]  being transferred to Mother Baby for routine progression of care     Report consisted of patients Situation, Background, Assessment and   Recommendations(SBAR). Information from the following report(s) SBAR, Procedure Summary, Intake/Output, MAR and Recent Results was reviewed with the receiving nurse. Opportunity for questions and clarification was provided.     Patient transported with:   Registered Nurse

## 2021-09-06 ENCOUNTER — ANESTHESIA (OUTPATIENT)
Dept: LABOR AND DELIVERY | Age: 31
DRG: 560 | End: 2021-09-06
Payer: MEDICAID

## 2021-09-06 ENCOUNTER — ANESTHESIA EVENT (OUTPATIENT)
Dept: LABOR AND DELIVERY | Age: 31
DRG: 560 | End: 2021-09-06
Payer: MEDICAID

## 2021-09-06 PROCEDURE — 77030007879 HC KT SPN EPDRL TELE -B: Performed by: ANESTHESIOLOGY

## 2021-09-06 PROCEDURE — 00HU33Z INSERTION OF INFUSION DEVICE INTO SPINAL CANAL, PERCUTANEOUS APPROACH: ICD-10-PCS | Performed by: OBSTETRICS & GYNECOLOGY

## 2021-09-06 PROCEDURE — 74011000250 HC RX REV CODE- 250: Performed by: ANESTHESIOLOGY

## 2021-09-06 PROCEDURE — 74011250637 HC RX REV CODE- 250/637: Performed by: MIDWIFE

## 2021-09-06 PROCEDURE — 74011250636 HC RX REV CODE- 250/636: Performed by: MIDWIFE

## 2021-09-06 PROCEDURE — 76060000078 HC EPIDURAL ANESTHESIA: Performed by: ANESTHESIOLOGY

## 2021-09-06 PROCEDURE — 75410000002 HC LABOR FEE PER 1 HR: Performed by: OBSTETRICS & GYNECOLOGY

## 2021-09-06 PROCEDURE — 65270000029 HC RM PRIVATE

## 2021-09-06 PROCEDURE — 74011250636 HC RX REV CODE- 250/636: Performed by: ANESTHESIOLOGY

## 2021-09-06 PROCEDURE — 75410000003 HC RECOV DEL/VAG/CSECN EA 0.5 HR: Performed by: OBSTETRICS & GYNECOLOGY

## 2021-09-06 PROCEDURE — 75410000000 HC DELIVERY VAGINAL/SINGLE: Performed by: OBSTETRICS & GYNECOLOGY

## 2021-09-06 RX ORDER — PROMETHAZINE HYDROCHLORIDE 25 MG/ML
25 INJECTION, SOLUTION INTRAMUSCULAR; INTRAVENOUS
Status: DISCONTINUED | OUTPATIENT
Start: 2021-09-06 | End: 2021-09-07 | Stop reason: HOSPADM

## 2021-09-06 RX ORDER — AMOXICILLIN 250 MG
1 CAPSULE ORAL
Status: DISCONTINUED | OUTPATIENT
Start: 2021-09-06 | End: 2021-09-07 | Stop reason: HOSPADM

## 2021-09-06 RX ORDER — OXYCODONE AND ACETAMINOPHEN 5; 325 MG/1; MG/1
2 TABLET ORAL
Status: DISCONTINUED | OUTPATIENT
Start: 2021-09-06 | End: 2021-09-07 | Stop reason: HOSPADM

## 2021-09-06 RX ORDER — ROPIVACAINE HYDROCHLORIDE 2 MG/ML
INJECTION, SOLUTION EPIDURAL; INFILTRATION; PERINEURAL AS NEEDED
Status: DISCONTINUED | OUTPATIENT
Start: 2021-09-06 | End: 2021-09-06 | Stop reason: HOSPADM

## 2021-09-06 RX ORDER — ACETAMINOPHEN 325 MG/1
650 TABLET ORAL
Status: DISCONTINUED | OUTPATIENT
Start: 2021-09-06 | End: 2021-09-07 | Stop reason: HOSPADM

## 2021-09-06 RX ORDER — IBUPROFEN 400 MG/1
800 TABLET ORAL
Status: DISCONTINUED | OUTPATIENT
Start: 2021-09-06 | End: 2021-09-07 | Stop reason: HOSPADM

## 2021-09-06 RX ORDER — ZOLPIDEM TARTRATE 5 MG/1
5 TABLET ORAL
Status: DISCONTINUED | OUTPATIENT
Start: 2021-09-06 | End: 2021-09-07 | Stop reason: HOSPADM

## 2021-09-06 RX ORDER — LIDOCAINE HYDROCHLORIDE AND EPINEPHRINE 20; 5 MG/ML; UG/ML
INJECTION, SOLUTION EPIDURAL; INFILTRATION; INTRACAUDAL; PERINEURAL AS NEEDED
Status: DISCONTINUED | OUTPATIENT
Start: 2021-09-06 | End: 2021-09-06 | Stop reason: HOSPADM

## 2021-09-06 RX ADMIN — LIDOCAINE HYDROCHLORIDE,EPINEPHRINE BITARTRATE 2 ML: 20; .005 INJECTION, SOLUTION EPIDURAL; INFILTRATION; INTRACAUDAL; PERINEURAL at 00:30

## 2021-09-06 RX ADMIN — Medication 87.3 MILLI-UNITS/MIN: at 00:34

## 2021-09-06 RX ADMIN — ROPIVACAINE HYDROCHLORIDE 5 ML: 2 INJECTION EPIDURAL; INFILTRATION; PERINEURAL at 00:31

## 2021-09-06 RX ADMIN — ROPIVACAINE HYDROCHLORIDE 5 ML: 2 INJECTION EPIDURAL; INFILTRATION; PERINEURAL at 00:32

## 2021-09-06 RX ADMIN — IBUPROFEN 800 MG: 400 TABLET, FILM COATED ORAL at 09:14

## 2021-09-06 RX ADMIN — METHYLERGONOVINE MALEATE 0.2 MG: 0.2 INJECTION, SOLUTION INTRAMUSCULAR; INTRAVENOUS at 00:43

## 2021-09-06 RX ADMIN — IBUPROFEN 800 MG: 400 TABLET, FILM COATED ORAL at 19:57

## 2021-09-06 NOTE — PROGRESS NOTES
TRANSFER - IN REPORT:    Verbal report received from Ron De La Rosa RN (name) on Beti Caceres  being received from L&D (unit) for routine progression of care      Report consisted of patients Situation, Background, Assessment and   Recommendations(SBAR). Information from the following report(s) SBAR, Kardex, Intake/Output, MAR and Recent Results was reviewed with the receiving nurse. Opportunity for questions and clarification was provided. Assessment completed upon patients arrival to unit and care assumed. 4623 Bedside and Verbal shift change report given to JESSENIA Rodriguez RN (oncoming nurse) by NIHARIKA Urbina RN (offgoing nurse). Report included the following information SBAR, Kardex, Intake/Output, MAR and Recent Results.

## 2021-09-06 NOTE — PROGRESS NOTES
Problem: Vaginal Delivery: Day of Delivery-Post delivery  Goal: Nutrition/Diet  Outcome: Progressing Towards Goal  Goal: Discharge Planning  Outcome: Progressing Towards Goal  Goal: *Vital signs within defined limits  Outcome: Progressing Towards Goal  Goal: *Hemodynamically stable  Outcome: Progressing Towards Goal  Goal: *Optimal pain control at patient's stated goal  Outcome: Progressing Towards Goal  Goal: *Participates in infant care  Outcome: Progressing Towards Goal  Goal: *Demonstrates progressive activity  Outcome: Progressing Towards Goal  Goal: *Tolerating diet  Outcome: Progressing Towards Goal     Problem: Pain  Goal: *Control of Pain  Outcome: Progressing Towards Goal

## 2021-09-06 NOTE — PROGRESS NOTES
OB Labor Note    Assessment:   IUP in active labor   Cat II fetal tracing - overall reassurng   Low risk PPH   Desires TOLAC   Desires epidural    Plan: Anesthesia consult for epidural   Continue to monitor labor   Anticipate    Expectant Management   Fetal resuscitative measures as indicated    Subjective:   Pt. does have complaint of ctx intenasity. Pt. is feeling contractions. Pt. is feeling fetal movement. Objective: Cat II fetal tracing - baseline rate 120, no acclerations, late and variable declerations, minimal to mod. Variability    Ctx every 2 min     Visit Vitals  /61   Pulse 88   Temp 98.7 °F (37.1 °C)   Resp 20   Ht 5' 1\" (1.549 m)   Wt 77.6 kg (171 lb)   SpO2 99%   BMI 32.31 kg/m²      Cervical Exam  Dilation (cm): 7  Eff: 100 %  Station: -2  Vaginal exam done by? : YANG Gonzalez     Patient Vitals for the past 4 hrs: Mode Fetal Heart Rate Fetal Activity Variability Decelerations Accelerations RN Reviewed Strip? Provider who reviewed strip? FHR Interventions   21 2224 External 120 -- (!) Less than or equal to 5 BPM Early Yes Yes -- --   21 2200 External 120 -- (!) Less than or equal to 5 BPM Early No Yes -- --   21 -- -- -- -- -- -- Yes -- --   21 213 External 120 -- (!) Less than or equal to 5 BPM (!) Late Yes Yes -- --   21 -- -- -- -- -- -- Yes -- --   21 External 125 -- 6-25 BPM None Yes Yes -- --   21 -- -- -- -- -- -- Yes YANG Gonzalez --   21 External 125 Present 6-25 BPM (!) Late Yes Yes YANG Gonzalez --   21 -- -- -- -- -- -- Yes -- --   21 -- -- -- -- -- -- -- -- Lateral Left   21 -- -- -- -- -- -- -- -- Lateral Right          2021 11:57 PM

## 2021-09-06 NOTE — PROGRESS NOTES
1430 Bedside and Verbal shift change report given to FILIBERTO Hitchcock, RN (oncoming nurse) by Steven Suero. Gume Roberto, JAN (offgoing nurse). Report included the following information SBAR, Kardex, Intake/Output, MAR and Recent Results. 1545 Assessment completed at this time. Pt denies further needs at this time. Pt to feed infant at this time. 1915 Bedside and Verbal shift change report given to NATHANAEL Robbins (oncoming nurse) by Elvia Flores RN (offgoing nurse).  Report included the following information SBAR, Kardex, Intake/Output, MAR and Recent Results. '

## 2021-09-06 NOTE — ANESTHESIA PREPROCEDURE EVALUATION
Relevant Problems   No relevant active problems       Anesthetic History   No history of anesthetic complications            Review of Systems / Medical History  Patient summary reviewed, nursing notes reviewed and pertinent labs reviewed    Pulmonary            Asthma        Neuro/Psych   Within defined limits           Cardiovascular  Within defined limits                     GI/Hepatic/Renal  Within defined limits              Endo/Other  Within defined limits           Other Findings              Physical Exam    Airway  Mallampati: II  TM Distance: 4 - 6 cm  Neck ROM: normal range of motion   Mouth opening: Normal     Cardiovascular  Regular rate and rhythm,  S1 and S2 normal,  no murmur, click, rub, or gallop             Dental  No notable dental hx       Pulmonary  Breath sounds clear to auscultation               Abdominal  GI exam deferred       Other Findings            Anesthetic Plan    ASA: 2  Anesthesia type: epidural          Induction: Intravenous  Anesthetic plan and risks discussed with: Patient

## 2021-09-06 NOTE — ANESTHESIA PROCEDURE NOTES
Epidural Block    Patient location during procedure: OB  Start time: 9/6/2021 12:20 AM  End time: 9/6/2021 12:33 AM  Reason for block: labor epidural  Staffing  Performed: attending   Anesthesiologist: Gita Nelson MD  Preanesthetic Checklist  Completed: patient identified, IV checked, site marked, risks and benefits discussed, surgical consent, monitors and equipment checked, pre-op evaluation and timeout performed  Block Placement  Patient position: sitting  Prep: ChloraPrep  Sterility prep: cap, gloves and mask  Sedation level: no sedation  Patient monitoring: continuous pulse oximetry and frequent blood pressure checks  Approach: midline  Location: lumbar  Epidural  Loss of resistance technique: saline  Guidance: landmark technique  Needle  Needle type: Tuohy   Needle gauge: 17 G  Needle length: 9 cm  Needle insertion depth: 5 cm  Catheter type: multi-orifice  Catheter size: 19 G  Catheter at skin depth: 10 cm  Test dose: negative  Assessment  Block outcome: pain improved  Number of attempts: 1  Procedure assessment: patient tolerated procedure well with no immediate complications  Additional Notes  Patient delivered before catheter could be taped.

## 2021-09-06 NOTE — PROGRESS NOTES
0725 Bedside and Verbal shift change report given to Jessa Isaac (oncoming nurse) by Lai Grier (offgoing nurse). Report included the following information SBAR, Kardex, Procedure Summary, Intake/Output, MAR and Recent Results. Infant sleeping in [facility]; no distress observed. Pt appears sleeping quietly in bed at this time    0910 assessment done; see flowsheets. Pt amb to BR; steady when up, michele well    1005 pt resting in bed; no c/o or needs verbalized when asked    1155 pt resting; watching TV    1245 pt in bed, resting. No needs verbalized    1400 pt noted sleeping in bed; infant also sleeping in bed w/ pt. Pt aroused to wake up and re educated on safe sleep for infant. Infant returned to [facility]. 1430 Bedside and Verbal shift change report given to [facility] (oncoming nurse) by Jessa Isaac (offgoing nurse). Report included the following information SBAR, Kardex, Procedure Summary, Intake/Output, MAR and Recent Results.

## 2021-09-06 NOTE — PROGRESS NOTES
DELIVERY SUMMARY    Patient:  July Blankenship    Delivery Type: Vaginal, Spontaneous   Delivery Date: 2021   Delivery Time: 0034 AM      Birth Weight:   4812S (5.10#)     Sex:  male  Circumcision: desires  Delivery Clinician:  Betzy Loaiza   Gestational Age: 38+3 wga     Presentation: Vertex   Position: OA to Left  Occiput  Anterior      Apgars were 8  and 9       Resuscitation Method: Tactile Stimulation;Suctioning-bulb     Meconium Stained: None    Living Status: Living        Placenta Date/Time: 2021  0039 AM   Placenta Removal: Spontaneous   Placenta Appearance: Normal     Cord Information: 3 Vessels    Cord Events:     tight CAN X2    Disposition of Cord Blood: lab    Blood Gases Sent?:  No         Cord pH:  none     Episiotomy: none  Laceration(s):    bilateral periurethral/ vaginal - no repair - hemostatic  Estimated Blood Loss (ml): 200     Labor Events  Method:     spontaneous  Augmentation:  AROM  Cervical Ripening:   n/a     Induction - no     Induction Indication - N/A     Induction Method - N/A     Complications - , tight CAN X2, precip delivery     NICU Admit - no     HTN Disorders - none     Diabetes - N/A     Operative Vaginal Delivery - none     Additional Delivery Comments - patient sitting up for epidural then precip  viable male infant, OA to GAYE, tight CAN X2 - somersaulted and reduced, infant to mother in stable condition for apgars of 8/9, delayed cord clamping for 3 minutes until pulsations ceased, cord clamped and cut by mother and cord blood for ABO/DNA obtained, pitocin infusing for active management of third stage, placenta delivered appearing intact, pearson, spont.  With 3 vc, fundus firm @ U-2,  perineum inspected - bilateral periurethral and vaginal floor lacerations noted - no repair as hemostatic,  complications: , tight CAN X2, precip delivery; mother and infant in stable condition

## 2021-09-06 NOTE — PROGRESS NOTES
Problem: Vaginal Delivery: Day of Delivery-Post delivery  Goal: Activity/Safety  Outcome: Progressing Towards Goal  Goal: Consults, if ordered  Outcome: Progressing Towards Goal  Goal: Nutrition/Diet  Outcome: Progressing Towards Goal  Goal: Discharge Planning  Outcome: Progressing Towards Goal  Goal: Medications  Outcome: Progressing Towards Goal  Goal: Treatments/Interventions/Procedures  Outcome: Progressing Towards Goal  Goal: *Vital signs within defined limits  Outcome: Progressing Towards Goal  Goal: *Labs within defined limits  Outcome: Progressing Towards Goal  Goal: *Hemodynamically stable  Outcome: Progressing Towards Goal  Goal: *Optimal pain control at patient's stated goal  Outcome: Progressing Towards Goal  Goal: *Participates in infant care  Outcome: Progressing Towards Goal  Goal: *Demonstrates progressive activity  Outcome: Progressing Towards Goal  Goal: *Tolerating diet  Outcome: Progressing Towards Goal     Problem: Pain  Goal: *Control of Pain  Outcome: Progressing Towards Goal

## 2021-09-06 NOTE — H&P
History & Physical    Name: Julio Campbell MRN: [id number]  SSN: [id number]    YOB: 1990  Age: 32 y.o. Sex: female        Subjective: painful contractions, desires TOLAC, denies VB or LOF, reports good FM     Estimated Date of Delivery: 21  OB History        2    Para   1    Term   1            AB        Living   1       SAB        TAB        Ectopic        Molar        Multiple   0    Live Births   1                  Ms. Rea Willis, a 33 yo , is admitted with pregnancy at 38w0d for Increasingly painful contractions. Prenatal course was complicated by UTI, hx PCD for FTP, excessive weight gain. Please see prenatal records for details. Allergies   Allergen Reactions    Latex Rash    Penicillins Rash    Tramadol Nausea Only       Objective:     Vitals:  Vitals:    21   BP:       Pulse:       Resp:       Temp:       SpO2: 100% 100% 100% 99%   Weight:       Height:            Physical Exam:  Patient without distress. Abdomen: soft, nontender  Fundus: soft and non tender  Perineum: blood absent, amniotic fluid absent  Cervical Exam: 4 cm dilated    90% effaced    -2 station    Presenting Part: cephalic  Cervical Position: mid position  Consistency: Soft  Lower Extremities:  - Edema No    Membranes:  Intact  Fetal Heart Rate & Contraction pattern: CAT I at admission   Baseline: 125 per minute  Variability: moderate  Accelerations: yes  Decelerations: early monitoring occ.  Late declerations, now no declerations  Uterine contractions: irregular, every 2-9 minutes    Prenatal Labs:   Lab Results   Component Value Date/Time    Rubella, External Non-Immune 2021 12:00 AM    GrBStrep, External Negative 2021 12:00 AM    HBsAg, External Negative 2021 12:00 AM    HIV, External Non-Reactive 2021 12:00 AM    RPR, External Non-Reactive 2021 12:00 AM    Gonorrhea, External Negative 2021 12:00 AM Chlamydia, External Negative 02/22/2021 12:00 AM         Assessment/Plan: reassuring fetal status, desires TOLAC, moderate risk PPH     Plan: Admit for Reassuring fetal status, Labor  Continue expectant management, Continue plan for vaginal delivery. Group B Strep was negative. Pain management per patient request. Rubella NI - offer MMR post partum. Dr. Vale Turcios aware of [address] admission.     Signed By:  Julian Muir CNM     September 5, 2021

## 2021-09-07 VITALS
WEIGHT: 171 LBS | BODY MASS INDEX: 32.28 KG/M2 | HEIGHT: 61 IN | RESPIRATION RATE: 17 BRPM | OXYGEN SATURATION: 100 % | SYSTOLIC BLOOD PRESSURE: 108 MMHG | TEMPERATURE: 98.2 F | DIASTOLIC BLOOD PRESSURE: 73 MMHG | HEART RATE: 79 BPM

## 2021-09-07 LAB
HCT VFR BLD AUTO: 29 % (ref 35–45)
HGB BLD-MCNC: 9.3 G/DL (ref 12–16)

## 2021-09-07 PROCEDURE — 85018 HEMOGLOBIN: CPT

## 2021-09-07 PROCEDURE — 36415 COLL VENOUS BLD VENIPUNCTURE: CPT

## 2021-09-07 PROCEDURE — 74011250637 HC RX REV CODE- 250/637: Performed by: MIDWIFE

## 2021-09-07 RX ORDER — IBUPROFEN 800 MG/1
800 TABLET ORAL
Qty: 90 TABLET | Refills: 0 | Status: SHIPPED | OUTPATIENT
Start: 2021-09-07

## 2021-09-07 RX ADMIN — OXYCODONE HYDROCHLORIDE AND ACETAMINOPHEN 2 TABLET: 5; 325 TABLET ORAL at 08:39

## 2021-09-07 NOTE — PROGRESS NOTES
7928 Bedside and Verbal shift change report given to Angelo Boyce RN (oncoming nurse) by Ashley Kilgore. Libertad Lin RN (offgoing nurse). Report included the following information SBAR, Kardex, Intake/Output, MAR and Recent Results. 1550 AVS and discharge teachings reviewed, pt verbalized understanding and e-signed. Arm bands verified and matching, pt discharged home in stable condition with baby in car seat in stable condition.

## 2021-09-07 NOTE — LACTATION NOTE
Breastfeeding discharge teaching completed to include feeding on demand, foremilk and hindmilk importance, engorgement, mastitis, clogged ducts, pumping, breastmilk storage, and returning to work. Information given about unit and office phone numbers and encouraged mom to reach out if concerns arise, but that [facility] would be calling her in the next few days to follow up on breastfeeding. Mom verbalized understanding and no questions at this time.

## 2021-09-07 NOTE — PROGRESS NOTES
Problem: Vaginal Delivery: Day of Delivery-Post delivery  Goal: Activity/Safety  Outcome: Progressing Towards Goal  Goal: Consults, if ordered  Outcome: Progressing Towards Goal  Goal: Nutrition/Diet  Outcome: Progressing Towards Goal  Goal: Discharge Planning  Outcome: Progressing Towards Goal  Goal: Medications  Outcome: Progressing Towards Goal  Goal: Treatments/Interventions/Procedures  Outcome: Progressing Towards Goal  Goal: *Vital signs within defined limits  Outcome: Progressing Towards Goal  Goal: *Labs within defined limits  Outcome: Progressing Towards Goal  Goal: *Hemodynamically stable  Outcome: Progressing Towards Goal  Goal: *Optimal pain control at patient's stated goal  Outcome: Progressing Towards Goal  Goal: *Participates in infant care  Outcome: Progressing Towards Goal  Goal: *Demonstrates progressive activity  Outcome: Progressing Towards Goal  Goal: *Tolerating diet  Outcome: Progressing Towards Goal     Problem: Pain  Goal: *Control of Pain  Outcome: Progressing Towards Goal     Problem: Vaginal Delivery: Postpartum Day 1  Goal: Off Pathway (Use only if patient is Off Pathway)  Outcome: Progressing Towards Goal  Goal: Activity/Safety  Outcome: Progressing Towards Goal  Goal: Consults, if ordered  Outcome: Progressing Towards Goal  Goal: Diagnostic Test/Procedures  Outcome: Progressing Towards Goal  Goal: Nutrition/Diet  Outcome: Progressing Towards Goal  Goal: Discharge Planning  Outcome: Progressing Towards Goal  Goal: Medications  Outcome: Progressing Towards Goal  Goal: Treatments/Interventions/Procedures  Outcome: Progressing Towards Goal  Goal: Psychosocial  Outcome: Progressing Towards Goal  Goal: *Vital signs within defined limits  Outcome: Progressing Towards Goal  Goal: *Labs within defined limits  Outcome: Progressing Towards Goal  Goal: *Hemodynamically stable  Outcome: Progressing Towards Goal  Goal: *Optimal pain control at patient's stated goal  Outcome: Progressing Towards Goal  Goal: *Participates in infant care  Outcome: Progressing Towards Goal  Goal: *Demonstrates progressive activity  Outcome: Progressing Towards Goal  Goal: *Performs self perineal care  Outcome: Progressing Towards Goal  Goal: *Appropriate parent-infant bonding  Outcome: Progressing Towards Goal  Goal: *Tolerating diet  Outcome: Progressing Towards Goal  Goal: *Performs self breast care  Outcome: Progressing Towards Goal

## 2021-09-07 NOTE — DISCHARGE SUMMARY
Obstetrical Discharge Summary     Name: Luis Eduardo Kimbrough MRN: [id number]  SSN: [id number]    YOB: 1990  Age: 32 y.o. Sex: female      Allergies: Latex, Penicillins, and Tramadol    Admit Date: 2021    Discharge Date: 2021     Admitting Physician: Dirk Dailey MD     Attending Physician:  Theo Oconnor MD     * Admission Diagnoses: Term pregnancy [Z34.90]    * Discharge Diagnoses:   Information for the patient's :  Kendy Bernal [[id number]]   Delivery of a 2.56 kg male infant via Vaginal Birth After   on 2021 at 12:34 AM  by Kenisha Torres. Apgars were 8  and 9 . Additional Diagnoses:   Hospital Problems as of 2021 Date Reviewed: 2021        Codes Class Noted - Resolved POA    Term pregnancy ICD-10-CM: Z34.90  ICD-9-CM: V22.1  2021 - Present Unknown             Lab Results   Component Value Date/Time    ABO/Rh(D) O POSITIVE 2021 08:42 PM    Rubella, External Non-Immune 2021 12:00 AM    GrBStrep, External Negative 2021 12:00 AM    ABO,Rh O positve 2015 12:00 AM    There is no immunization history for the selected administration types on file for this patient. * Procedures:   * No surgery found *           * Discharge Condition: good    [facility] Course: Normal hospital course following the delivery. * Disposition: Home    Discharge Medications:   Current Discharge Medication List      START taking these medications    Details   ibuprofen (MOTRIN) 800 mg tablet Take 1 Tablet by mouth every eight (8) hours as needed (Pain scale 4-6). Qty: 90 Tablet, Refills: 0  Start date: 2021         CONTINUE these medications which have NOT CHANGED    Details   pnv w/o calcium-iron fum-fa 27-1 mg tab Take  by mouth daily. * Follow-up Care/Patient Instructions:   Activity: Activity as tolerated  Diet: Regular Diet  Wound Care: Keep wound clean and dry    Follow-up Information     Follow up With Specialties Details Why Contact Info    Gume Feldman CNM Certified Nurse Midwife Schedule an appointment as soon as possible for a visit in 7 weeks  [address] [place] [address] [address] [facility]  [phone]

## 2021-09-07 NOTE — ANESTHESIA POSTPROCEDURE EVALUATION
9/7/2021  1:44 PM    Laboring Epidural Follow-up Note     Referring physician: Carlyle Blount,*   Patient status post vaginal delivery with labor epidural    Visit Vitals  /73 (BP 1 Location: Left arm, BP Patient Position: At rest)   Pulse 79   Temp 36.8 °C (98.2 °F)   Resp 17   Ht 5' 1\" (1.549 m)   Wt 77.6 kg (171 lb)   SpO2 100%   Breastfeeding Unknown   BMI 32.31 kg/m²       Epidural removed by L&D staff  No sedation, pruritis noted. Adequate analgesia.   No obvious anesthesia complications          Shimon No CRNA

## 2021-09-07 NOTE — PROGRESS NOTES
Progress Note    Patient: Sarah Beth Owens MRN: [id number]     YOB: 1990  Age: 32 y.o. Subjective:     Postpartum Day: 1    The patient is feeling well. Pain is  well controlled with current medications. Urinary output is adequate. Baby is feeding via breast without difficulty. Objective:      Patient Vitals for the past 12 hrs:   Temp Pulse Resp BP SpO2   09/06/21 2330 98.3 °F (36.8 °C) 77 15 108/64 100 %       General:    alert, cooperative, no distress   Lochia:  appropriate   Uterine Fundus:   firm @ umbilicus    Perineum:  well-approximated   DVT Evaluation:  No evidence of DVT seen on physical exam.  Negative Shweta's sign. Lab/Data Review:  Recent Results (from the past 24 hour(s))   HGB & HCT    Collection Time: 09/07/21  4:30 AM   Result Value Ref Range    HGB 9.3 (L) 12.0 - 16.0 g/dL    HCT 29.0 (L) 35.0 - 45.0 %     All lab results for the last 24 hours reviewed. Assessment:     Delivery: spontaneous vaginal delivery    Plan:     Doing well postpartum vaginal delivery. Continue current postpartum care. Encouraged hydration, nutrition and ambulation. Plan DC home tomorrow.     Signed By: Elisa Milton CNM     September 7, 2021

## 2021-09-07 NOTE — DISCHARGE INSTRUCTIONS
POST DELIVERY DISCHARGE INSTRUCTIONS    Name: Meche Nolan  YOB: 1990  Primary Diagnosis: Active Problems:    Term pregnancy (9/5/2021)        General:     Diet/Diet Restrictions:  Eight 8-ounce glasses of fluid daily (water, juices); avoid excessive caffeine intake. Meals/snacks as desired which are high in fiber and carbohydrates and low in fat and cholesterol. Physical Activity / Restrictions / Safety:     Avoid heavy lifting, no more than the baby alone (not the baby in the car seat). Avoid intercourse until you are seen at your postpartum visit. No douching or tampon use. Check with obstetrician before starting or resuming an exercise program.         Discharge Instructions/Special Treatment/Home Care Needs:     Continue prenatal vitamins. Continue to use squirt bottle with warm water on your perineum after each bathroom use until bleeding stops. Call your doctor for the following:     Fever over 101 degrees by mouth. Vaginal bleeding that soaks two pads per hour for more than one hour. Red streaks or increased swelling of legs, painful red streaks on your breast.  If you feel extremely anxious or overwhelmed. If you have thoughts of harming yourself and/or your baby. Pain Management:     Pain Management:   Take Acetaminophen (Tylenol) or Ibuprofen (Advil, Motrin), as directed for pain. Use a warm Sitz bath 3 times daily to relieve perineal or hemorrhoidal discomfort. For hemorrhoidal discomfort, use Tucks and Anusol cream as needed and directed.     Follow-Up Care:     Appointment with MD:   Follow-up Appointments   Procedures    FOLLOW UP VISIT Appointment in: 6 Weeks     Standing Status:   Standing     Number of Occurrences:   1     Order Specific Question:   Appointment in     Answer:   Curry Mandel     Telephone number: [phone]      Signed By: Rena Jiménez CNM

## 2022-03-18 PROBLEM — Z34.90 TERM PREGNANCY: Status: ACTIVE | Noted: 2021-09-05

## 2022-03-20 PROBLEM — N92.6 IRREGULAR MENSTRUATION: Status: ACTIVE | Noted: 2017-12-13

## 2022-03-20 PROBLEM — R10.31 RIGHT LOWER QUADRANT ABDOMINAL PAIN: Status: ACTIVE | Noted: 2017-12-13

## 2022-12-24 ENCOUNTER — HOSPITAL ENCOUNTER (EMERGENCY)
Age: 32
Discharge: HOME | End: 2022-12-24
Attending: EMERGENCY MEDICINE
Payer: MEDICAID

## 2022-12-24 VITALS
WEIGHT: 170 LBS | RESPIRATION RATE: 15 BRPM | SYSTOLIC BLOOD PRESSURE: 122 MMHG | BODY MASS INDEX: 32.12 KG/M2 | OXYGEN SATURATION: 100 % | DIASTOLIC BLOOD PRESSURE: 75 MMHG | HEART RATE: 98 BPM | TEMPERATURE: 97.9 F

## 2022-12-24 DIAGNOSIS — M54.50 ACUTE BILATERAL LOW BACK PAIN WITHOUT SCIATICA: Primary | ICD-10-CM

## 2022-12-24 LAB
APPEARANCE UR: ABNORMAL
BACTERIA URNS QL MICRO: ABNORMAL /HPF
BILIRUB UR QL: NEGATIVE
COLOR UR: YELLOW
EPITH CASTS URNS QL MICRO: ABNORMAL /LPF (ref 0–5)
GLUCOSE UR STRIP.AUTO-MCNC: NEGATIVE MG/DL
HCG UR QL: NEGATIVE
HGB UR QL STRIP: NEGATIVE
KETONES UR QL STRIP.AUTO: 40 MG/DL
LEUKOCYTE ESTERASE UR QL STRIP.AUTO: NEGATIVE
NITRITE UR QL STRIP.AUTO: POSITIVE
PH UR STRIP: 6 [PH] (ref 5–8)
PROT UR STRIP-MCNC: NEGATIVE MG/DL
RBC #/AREA URNS HPF: NEGATIVE /HPF (ref 0–5)
SP GR UR REFRACTOMETRY: 1.02 (ref 1–1.03)
UROBILINOGEN UR QL STRIP.AUTO: 1 EU/DL (ref 0.2–1)
WBC URNS QL MICRO: ABNORMAL /HPF (ref 0–5)

## 2022-12-24 PROCEDURE — 81001 URINALYSIS AUTO W/SCOPE: CPT

## 2022-12-24 PROCEDURE — 81025 URINE PREGNANCY TEST: CPT

## 2022-12-24 PROCEDURE — 99283 EMERGENCY DEPT VISIT LOW MDM: CPT

## 2022-12-24 RX ORDER — METHOCARBAMOL 750 MG/1
750 TABLET, FILM COATED ORAL 4 TIMES DAILY
Qty: 24 TABLET | Refills: 0 | Status: SHIPPED | OUTPATIENT
Start: 2022-12-24

## 2022-12-24 RX ORDER — IBUPROFEN 800 MG/1
800 TABLET ORAL
Qty: 30 TABLET | Refills: 0 | Status: SHIPPED | OUTPATIENT
Start: 2022-12-24 | End: 2023-01-03

## 2022-12-24 NOTE — DISCHARGE INSTRUCTIONS
Follow-up with orthopedics or your primary care doctor. Return to the ED for worsening symptoms or for other concerns. Follow-up with OB/GYN for vaginal bleeding.

## 2022-12-24 NOTE — ED PROVIDER NOTES
EMERGENCY DEPARTMENT HISTORY AND PHYSICAL EXAM    Date: 2022  Patient Name: Fanta Cotter    History of Presenting Illness     Chief Complaint   Patient presents with    Back Pain       History Provided By: Patient     History Maritza Ratliff):   4:42 PM  Fanta Cotter is a 28 y.o. female  with a PMHx of anemia  who presents to the emergency department (room Q5) by POV C/O bilateral low back pain onset today. Associated sxs include vaginal spotting. Pt denies dysuria, current pregnancy, red flag symptoms, risk factors for pathologic fracture or any other sxs or complaints. Patient states that her last menstrual cycle was earlier this month. She does not feel that she could be pregnant. She is accompanied today by her toddler. During my interview she has had 2 rage or bent over several times to  her toddler who appears to be in excess of 20 pounds. Chief Complaint: Low back pain  Onset: Today  Timing:  Acute  Context: Symptoms started spontaneously, symptoms have rapidly worsened since onset  Location: Bilateral low back  Quality: Sharp  Severity: Moderate  Modifying Factors: Nothing makes it better, movement makes it worse. Associated Symptoms:  Vaginal spotting    PCP: None     Past History         Past Medical History:  Past Medical History:   Diagnosis Date    Anemia     Asthma        Past Surgical History:  Past Surgical History:   Procedure Laterality Date    HX  SECTION  2016    HX HEENT      gum surgery    HX OTHER SURGICAL  2015    left fallopian tube removed and cyst removed right ovary.        Family History:  Family History   Problem Relation Age of Onset    Psychiatric Disorder Father     Asthma Sister     Headache Sister     Psychiatric Disorder Sister     Psychiatric Disorder Brother     Diabetes Maternal Grandmother    Reviewed and non-contributory    Social History:  Social History     Tobacco Use    Smoking status: Never    Smokeless tobacco: Never   Substance Use Topics    Alcohol use: No    Drug use: No       Medications:  Current Outpatient Medications   Medication Sig Dispense Refill    ibuprofen (MOTRIN) 800 mg tablet Take 1 Tablet by mouth every eight (8) hours as needed (Pain scale 4-6). 90 Tablet 0    pnv w/o calcium-iron fum-fa 27-1 mg tab Take  by mouth daily. Allergies: Allergies   Allergen Reactions    Latex Rash    Penicillins Rash    Tramadol Nausea Only       Social Determinants of Health:  Social Determinants of Health     Tobacco Use: Not on file   Alcohol Use: Not on file   Financial Resource Strain: Not on file   Food Insecurity: Not on file   Transportation Needs: Not on file   Physical Activity: Not on file   Stress: Not on file   Social Connections: Not on file   Intimate Partner Violence: Not on file   Depression: Not on file   Housing Stability: Not on file       Review of Systems      Review of Systems   Constitutional:  Negative for chills and fever. HENT:  Negative for congestion, rhinorrhea and sore throat. Eyes:  Negative for pain and visual disturbance. Respiratory:  Negative for cough, shortness of breath and wheezing. Cardiovascular:  Negative for chest pain and palpitations. Gastrointestinal:  Negative for abdominal pain, diarrhea and vomiting. Genitourinary:  Positive for vaginal bleeding. Negative for dysuria, flank pain, frequency and urgency. Musculoskeletal:  Positive for back pain. Negative for arthralgias and myalgias. Skin:  Negative for rash and wound. Neurological:  Negative for speech difficulty, weakness, light-headedness and headaches. Psychiatric/Behavioral:  Negative for agitation and confusion. All other systems reviewed and are negative. Physical Exam     Vitals:    12/24/22 1611   BP: 122/75   Pulse: 98   Resp: 15   Temp: 97.9 °F (36.6 °C)   SpO2: 100%   Weight: 77.1 kg (170 lb)       Physical Exam  Vitals and nursing note reviewed.    Constitutional:       General: She is not in acute distress. Appearance: Normal appearance. She is normal weight. She is not ill-appearing. HENT:      Head: Normocephalic and atraumatic. Nose: Nose normal. No rhinorrhea. Mouth/Throat:      Mouth: Mucous membranes are moist.      Pharynx: No oropharyngeal exudate or posterior oropharyngeal erythema. Eyes:      Extraocular Movements: Extraocular movements intact. Conjunctiva/sclera: Conjunctivae normal.      Pupils: Pupils are equal, round, and reactive to light. Cardiovascular:      Rate and Rhythm: Normal rate and regular rhythm. Heart sounds: No murmur heard. No friction rub. No gallop. Pulmonary:      Effort: Pulmonary effort is normal. No respiratory distress. Breath sounds: Normal breath sounds. No wheezing, rhonchi or rales. Abdominal:      General: Bowel sounds are normal.      Palpations: Abdomen is soft. Tenderness: There is no abdominal tenderness. There is no guarding or rebound. Musculoskeletal:         General: No swelling or deformity. Normal range of motion. Cervical back: Normal range of motion and neck supple. No rigidity. Lumbar back: Tenderness present. No swelling, edema, deformity, signs of trauma, lacerations, spasms or bony tenderness. Normal range of motion. No scoliosis. Back:    Lymphadenopathy:      Cervical: No cervical adenopathy. Skin:     General: Skin is warm and dry. Findings: No rash. Neurological:      General: No focal deficit present. Mental Status: She is alert and oriented to person, place, and time. Psychiatric:         Mood and Affect: Mood normal.         Behavior: Behavior normal.       Diagnostic Study Results     Labs -  No results found for this or any previous visit (from the past 12 hour(s)).     Radiologic Studies -   No orders to display     CT Results  (Last 48 hours)      None          CXR Results  (Last 48 hours)      None            Medications given in the ED-  Medications - No data to display    Procedures     Procedures    ED Course     I Félix Salas MD) am the first provider for this patient. I reviewed the vital signs, available nursing notes, past medical history, past surgical history, family history and social history. Records Reviewed: Nursing Notes    Cardiac Monitor:  Rate: 98 bpm  Rhythm: Sinus Rhythm    Pulse Oximetry Analysis - 100% on RA    4:42 PM Initial assessment performed. The patients presenting problems have been discussed, and they are in agreement with the care plan formulated and outlined with them. I have encouraged them to ask questions as they arise throughout their visit. Medical Decision Making     Provider Notes (Medical Decision Making):   DDX: Sprain, strain, subluxation, cystitis    Discussion:  28 y.o. female with low back pain starting this morning. Patient has no red flag symptoms. She has no risk factors for pathologic fracture. Patient is accompanied by her large toddler who she is picking up. Her back pain is most likely due to muscle strain from picking up her child. She has no red flag symptoms. She has no risk factors for pathologic fractures. She has no indication for imaging today. In evaluation of the above differential diagnosis, consideration was given to the following tests and treatments. Considered lumbar spine x-ray for low back pain, however x-rays were not indicated as described above. Urinalysis was performed to assess for UTI. UA showed signs of contamination but no signs of UTI. hCG was drawn to assess for pregnancy. hCG was negative. The decision to perform testing and results were discussed with the patient. I discussed each of these tests and considerations with the patient. The patient agrees with the plan of discharge. Diagnosis and Disposition     6:11 PM   DISCHARGE NOTE:  Eboni Beard  results have been reviewed with her.   She has been counseled regarding her diagnosis, treatment, and plan.  She verbally conveys understanding and agreement of the signs, symptoms, diagnosis, treatment and prognosis and additionally agrees to follow up as discussed. She also agrees with the care-plan and conveys that all of her questions have been answered. I have also provided discharge instructions for her that include: educational information regarding their diagnosis and treatment, and list of reasons why they would want to return to the ED prior to their follow-up appointment, should her condition change. She has been provided with education for proper emergency department utilization. CLINICAL IMPRESSION:    No diagnosis found. PLAN:  1. D/C Home  2. Current Discharge Medication List        3. Follow-up Information    None       I Beth Perry MD am the primary clinician of record. [employer] Disclaimer     Please note that this dictation was completed with Sounder, the computer voice recognition software. Quite often unanticipated grammatical, syntax, homophones, and other interpretive errors are inadvertently transcribed by the computer software. Please disregard these errors. Please excuse any errors that have escaped final proofreading.     Beth ePrry MD

## 2022-12-24 NOTE — ED TRIAGE NOTES
Pt presents for 1 days of lower back pain with nausea.  Denies urinary sx or abn discharge  LMP-12/10

## 2023-07-28 ENCOUNTER — HOSPITAL ENCOUNTER (EMERGENCY)
Facility: HOSPITAL | Age: 33
Discharge: HOME | End: 2023-07-28
Attending: EMERGENCY MEDICINE
Payer: MEDICAID

## 2023-07-28 VITALS
SYSTOLIC BLOOD PRESSURE: 115 MMHG | BODY MASS INDEX: 31.15 KG/M2 | DIASTOLIC BLOOD PRESSURE: 74 MMHG | WEIGHT: 165 LBS | TEMPERATURE: 97.2 F | HEART RATE: 88 BPM | RESPIRATION RATE: 14 BRPM | HEIGHT: 61 IN | OXYGEN SATURATION: 100 %

## 2023-07-28 DIAGNOSIS — S00.262A INSECT BITE OF LEFT EYELID, INITIAL ENCOUNTER: Primary | ICD-10-CM
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
## 2023-07-28 PROCEDURE — 99283 EMERGENCY DEPT VISIT LOW MDM: CPT

## 2023-07-28 RX ORDER — CEPHALEXIN 250 MG/1
250 CAPSULE ORAL 3 TIMES DAILY
Qty: 15 CAPSULE | Refills: 0 | Status: SHIPPED | OUTPATIENT
Start: 2023-07-28 | End: 2023-08-02

## 2023-07-28 RX ORDER — ERYTHROMYCIN 5 MG/G
OINTMENT OPHTHALMIC
Qty: 1 G | Refills: 0 | Status: SHIPPED | OUTPATIENT
Start: 2023-07-28 | End: 2023-08-07

## 2023-07-28 ASSESSMENT — ENCOUNTER SYMPTOMS
SINUS PRESSURE: 0
ABDOMINAL PAIN: 0
NAUSEA: 0
TROUBLE SWALLOWING: 0
SHORTNESS OF BREATH: 0
EYE DISCHARGE: 0
VOMITING: 0
EYE ITCHING: 0

## 2023-07-28 NOTE — ED TRIAGE NOTES
Patient ambulatory to ED c/o insect bite on left eyelid. Painful and itchy. Onset yesterday. Denies blurry vision at this time.

## 2023-07-28 NOTE — ED NOTES
Patient pushed call bell was found to be outside of room. Patient yelling in hallway this RN responded. Reviewed care plan with patient at this time. Patient stated \" no one has been in here they haven't checked my blood pressure or nothing\" this RN explained that triage nurse did check a full set of vitals and a provider has signed up to see her. At this time patient has no pending orders to be complete. Patient appears to be calm after speaking with nurse. MD made aware of patients complaint.  Charge nurse also made aware       Luna Anguiano RN  07/28/23 [id number]

## (undated) DEVICE — 2, DISPOSABLE SUCTION/IRRIGATOR WITH DISPOSABLE TIP: Brand: STRYKEFLOW

## (undated) DEVICE — D&C HYSTEROSCOPY: Brand: MEDLINE INDUSTRIES, INC.

## (undated) DEVICE — (D)SYR 10ML 1/5ML GRAD NSAF -- PKGING CHANGE USE ITEM 338027

## (undated) DEVICE — TABLE COVER: Brand: CONVERTORS

## (undated) DEVICE — TELFA NON-ADHERENT ABSORBENT DRESSING: Brand: TELFA

## (undated) DEVICE — Y-TYPE TUR/BLADDER IRRIGATION SET, REGULATING CLAMP

## (undated) DEVICE — SLEEVE TRCR L100MM DIA5MM UNIV STBL FOR BLDELSS DIL TIP

## (undated) DEVICE — CATH URETH INTMIT ROB 16FR FUN -- CONVERT TO ITEM 179520

## (undated) DEVICE — TROCAR LAP L100MM DIA5MM BLDELSS W/ STBL SL ENDOPATH XCEL

## (undated) DEVICE — GYN LAPAROSCOPY: Brand: MEDLINE INDUSTRIES, INC.

## (undated) DEVICE — SOLUTION LACTATED RINGERS INJECTION USP

## (undated) DEVICE — TUBE KT ENDFLTN INSUFFLTN SVL --

## (undated) DEVICE — STERILE POLYISOPRENE POWDER-FREE SURGICAL GLOVES: Brand: PROTEXIS

## (undated) DEVICE — 40580 - THE PINK PAD - ADVANCED TRENDELENBURG POSITIONING KIT: Brand: 40580 - THE PINK PAD - ADVANCED TRENDELENBURG POSITIONING KIT

## (undated) DEVICE — NEEDLE HYPO 22GA L1 1/2IN PIVOTING SHLD FOR LUERLOCK SYR

## (undated) DEVICE — SOL IRRIGATION INJ NACL 0.9% 500ML BTL

## (undated) DEVICE — SUT MONOCRYL PLUS UD 4-0 --